# Patient Record
Sex: MALE | Race: WHITE | URBAN - METROPOLITAN AREA
[De-identification: names, ages, dates, MRNs, and addresses within clinical notes are randomized per-mention and may not be internally consistent; named-entity substitution may affect disease eponyms.]

---

## 2017-01-04 ENCOUNTER — HOSPITAL ENCOUNTER (OUTPATIENT)
Dept: PREADMISSION TESTING | Age: 47
Discharge: HOME | End: 2017-01-23
Admitting: ORTHOPAEDIC SURGERY

## 2017-01-04 VITALS
HEART RATE: 84 BPM | SYSTOLIC BLOOD PRESSURE: 137 MMHG | RESPIRATION RATE: 18 BRPM | DIASTOLIC BLOOD PRESSURE: 81 MMHG | TEMPERATURE: 98.9 F | HEIGHT: 72 IN | WEIGHT: 294 LBS | BODY MASS INDEX: 39.82 KG/M2

## 2017-01-04 LAB
ABO/RH: NORMAL
ANION GAP SERPL CALCULATED.3IONS-SCNC: 13 MMOL/L (ref 3–16)
ANTIBODY SCREEN: NORMAL
APTT: 36.1 SEC (ref 25.2–36.4)
BASOPHILS ABSOLUTE: 0.1 K/UL (ref 0–0.2)
BASOPHILS RELATIVE PERCENT: 0.7 %
BILIRUBIN URINE: NEGATIVE
BLOOD, URINE: NEGATIVE
BUN BLDV-MCNC: 13 MG/DL (ref 7–20)
CALCIUM SERPL-MCNC: 9.7 MG/DL (ref 8.3–10.6)
CHLORIDE BLD-SCNC: 98 MMOL/L (ref 99–110)
CLARITY: CLEAR
CO2: 29 MMOL/L (ref 21–32)
COLOR: YELLOW
CREAT SERPL-MCNC: 0.9 MG/DL (ref 0.9–1.3)
EOSINOPHILS ABSOLUTE: 0.2 K/UL (ref 0–0.6)
EOSINOPHILS RELATIVE PERCENT: 1.9 %
GFR AFRICAN AMERICAN: >60
GFR NON-AFRICAN AMERICAN: >60
GLUCOSE BLD-MCNC: 114 MG/DL (ref 70–99)
GLUCOSE URINE: NEGATIVE MG/DL
HCT VFR BLD CALC: 46 % (ref 40.5–52.5)
HEMOGLOBIN: 15.4 G/DL (ref 13.5–17.5)
INR BLD: 0.95 (ref 0.85–1.16)
KETONES, URINE: NEGATIVE MG/DL
LEUKOCYTE ESTERASE, URINE: NEGATIVE
LYMPHOCYTES ABSOLUTE: 2.6 K/UL (ref 1–5.1)
LYMPHOCYTES RELATIVE PERCENT: 23.1 %
MCH RBC QN AUTO: 28.7 PG (ref 26–34)
MCHC RBC AUTO-ENTMCNC: 33.6 G/DL (ref 31–36)
MCV RBC AUTO: 85.5 FL (ref 80–100)
MICROSCOPIC EXAMINATION: NORMAL
MONOCYTES ABSOLUTE: 0.8 K/UL (ref 0–1.3)
MONOCYTES RELATIVE PERCENT: 6.8 %
NEUTROPHILS ABSOLUTE: 7.5 K/UL (ref 1.7–7.7)
NEUTROPHILS RELATIVE PERCENT: 67.5 %
NITRITE, URINE: NEGATIVE
PDW BLD-RTO: 14 % (ref 12.4–15.4)
PH UA: 6.5
PLATELET # BLD: 313 K/UL (ref 135–450)
PMV BLD AUTO: 7.1 FL (ref 5–10.5)
POTASSIUM SERPL-SCNC: 4.2 MMOL/L (ref 3.5–5.1)
PROTEIN UA: NEGATIVE MG/DL
PROTHROMBIN TIME: 10.8 SEC (ref 9.8–13)
RBC # BLD: 5.37 M/UL (ref 4.2–5.9)
SODIUM BLD-SCNC: 140 MMOL/L (ref 136–145)
SPECIFIC GRAVITY UA: 1.01
URINE TYPE: NORMAL
UROBILINOGEN, URINE: 0.2 E.U./DL
WBC # BLD: 11.1 K/UL (ref 4–11)

## 2017-01-04 RX ORDER — FUROSEMIDE 20 MG/1
20 TABLET ORAL 2 TIMES DAILY
COMMUNITY

## 2017-01-04 RX ORDER — CHLORHEXIDINE GLUCONATE 0.12 MG/ML
15 RINSE ORAL 2 TIMES DAILY
Status: CANCELLED | OUTPATIENT
Start: 2017-01-04

## 2017-01-04 RX ORDER — POTASSIUM CHLORIDE 750 MG/1
10 CAPSULE, EXTENDED RELEASE ORAL 2 TIMES DAILY
COMMUNITY

## 2017-01-04 RX ORDER — VARENICLINE TARTRATE 1 MG/1
1 TABLET, FILM COATED ORAL 2 TIMES DAILY
COMMUNITY

## 2017-01-04 ASSESSMENT — PAIN DESCRIPTION - FREQUENCY: FREQUENCY: CONTINUOUS

## 2017-01-04 ASSESSMENT — PAIN DESCRIPTION - ORIENTATION: ORIENTATION: RIGHT

## 2017-01-04 ASSESSMENT — PAIN DESCRIPTION - DESCRIPTORS: DESCRIPTORS: NAGGING;CONSTANT

## 2017-01-04 ASSESSMENT — PAIN DESCRIPTION - LOCATION: LOCATION: KNEE;BACK

## 2017-01-05 LAB
MRSA SCREEN RT-PCR: NORMAL
URINE CULTURE, ROUTINE: NORMAL

## 2017-01-09 ENCOUNTER — TELEPHONE (OUTPATIENT)
Dept: ORTHOPEDIC SURGERY | Age: 47
End: 2017-01-09

## 2017-01-16 ENCOUNTER — TELEPHONE (OUTPATIENT)
Dept: ORTHOPEDIC SURGERY | Age: 47
End: 2017-01-16

## 2017-01-16 RX ORDER — CLINDAMYCIN HYDROCHLORIDE 300 MG/1
300 CAPSULE ORAL 3 TIMES DAILY
Qty: 30 CAPSULE | Refills: 0 | Status: SHIPPED | OUTPATIENT
Start: 2017-01-16 | End: 2017-01-26

## 2017-01-18 ENCOUNTER — OFFICE VISIT (OUTPATIENT)
Dept: ORTHOPEDIC SURGERY | Age: 47
End: 2017-01-18

## 2017-01-18 VITALS
DIASTOLIC BLOOD PRESSURE: 75 MMHG | BODY MASS INDEX: 39.9 KG/M2 | SYSTOLIC BLOOD PRESSURE: 124 MMHG | HEART RATE: 83 BPM | WEIGHT: 285 LBS | HEIGHT: 71 IN

## 2017-01-18 DIAGNOSIS — Z96.651 STATUS POST TOTAL RIGHT KNEE REPLACEMENT: Primary | ICD-10-CM

## 2017-01-18 PROCEDURE — 99024 POSTOP FOLLOW-UP VISIT: CPT | Performed by: ORTHOPAEDIC SURGERY

## 2017-01-19 ENCOUNTER — TELEPHONE (OUTPATIENT)
Dept: ORTHOPEDIC SURGERY | Age: 47
End: 2017-01-19

## 2017-01-25 ENCOUNTER — TELEPHONE (OUTPATIENT)
Dept: ORTHOPEDIC SURGERY | Age: 47
End: 2017-01-25

## 2017-01-26 DIAGNOSIS — M17.11 PRIMARY OSTEOARTHRITIS OF RIGHT KNEE: Primary | Chronic | ICD-10-CM

## 2017-01-30 ENCOUNTER — TELEPHONE (OUTPATIENT)
Dept: ORTHOPEDIC SURGERY | Age: 47
End: 2017-01-30

## 2017-02-15 ENCOUNTER — OFFICE VISIT (OUTPATIENT)
Dept: ORTHOPEDIC SURGERY | Age: 47
End: 2017-02-15

## 2017-02-15 VITALS
DIASTOLIC BLOOD PRESSURE: 81 MMHG | HEART RATE: 79 BPM | HEIGHT: 71 IN | WEIGHT: 285 LBS | BODY MASS INDEX: 39.9 KG/M2 | SYSTOLIC BLOOD PRESSURE: 138 MMHG

## 2017-02-15 DIAGNOSIS — M25.461 EFFUSION OF RIGHT KNEE: ICD-10-CM

## 2017-02-15 DIAGNOSIS — Z96.651 STATUS POST TOTAL RIGHT KNEE REPLACEMENT: Primary | ICD-10-CM

## 2017-02-15 PROCEDURE — 99024 POSTOP FOLLOW-UP VISIT: CPT | Performed by: ORTHOPAEDIC SURGERY

## 2017-02-15 RX ORDER — DICLOFENAC SODIUM 75 MG/1
75 TABLET, DELAYED RELEASE ORAL 2 TIMES DAILY
Qty: 60 TABLET | Refills: 3 | Status: SHIPPED | OUTPATIENT
Start: 2017-02-15

## 2021-07-16 RX ORDER — CLONIDINE HYDROCHLORIDE 0.1 MG/1
0.1 TABLET ORAL NIGHTLY
COMMUNITY

## 2021-07-16 RX ORDER — BUPRENORPHINE HYDROCHLORIDE AND NALOXONE HYDROCHLORIDE DIHYDRATE 8; 2 MG/1; MG/1
1 TABLET SUBLINGUAL DAILY
COMMUNITY

## 2021-07-16 RX ORDER — CARVEDILOL 6.25 MG/1
6.25 TABLET ORAL 2 TIMES DAILY WITH MEALS
COMMUNITY

## 2021-09-09 ENCOUNTER — OFFICE VISIT (OUTPATIENT)
Dept: BARIATRICS/WEIGHT MGMT | Facility: CLINIC | Age: 51
End: 2021-09-09

## 2021-09-09 ENCOUNTER — DOCUMENTATION (OUTPATIENT)
Dept: BARIATRICS/WEIGHT MGMT | Facility: CLINIC | Age: 51
End: 2021-09-09

## 2021-09-09 VITALS
HEIGHT: 70 IN | RESPIRATION RATE: 18 BRPM | TEMPERATURE: 98.4 F | BODY MASS INDEX: 45.1 KG/M2 | HEART RATE: 75 BPM | SYSTOLIC BLOOD PRESSURE: 130 MMHG | OXYGEN SATURATION: 98 % | WEIGHT: 315 LBS | DIASTOLIC BLOOD PRESSURE: 74 MMHG

## 2021-09-09 DIAGNOSIS — K21.9 GASTROESOPHAGEAL REFLUX DISEASE, UNSPECIFIED WHETHER ESOPHAGITIS PRESENT: ICD-10-CM

## 2021-09-09 DIAGNOSIS — R53.83 FATIGUE, UNSPECIFIED TYPE: ICD-10-CM

## 2021-09-09 DIAGNOSIS — M54.9 BACK PAIN, UNSPECIFIED BACK LOCATION, UNSPECIFIED BACK PAIN LATERALITY, UNSPECIFIED CHRONICITY: ICD-10-CM

## 2021-09-09 DIAGNOSIS — Z87.01 HISTORY OF PNEUMONIA: ICD-10-CM

## 2021-09-09 DIAGNOSIS — I10 ESSENTIAL HYPERTENSION: ICD-10-CM

## 2021-09-09 DIAGNOSIS — E66.01 OBESITY, CLASS III, BMI 40-49.9 (MORBID OBESITY) (HCC): Primary | ICD-10-CM

## 2021-09-09 DIAGNOSIS — G47.33 OBSTRUCTIVE SLEEP APNEA SYNDROME: ICD-10-CM

## 2021-09-09 DIAGNOSIS — M19.90 OSTEOARTHRITIS, UNSPECIFIED OSTEOARTHRITIS TYPE, UNSPECIFIED SITE: ICD-10-CM

## 2021-09-09 DIAGNOSIS — I87.2 VENOUS (PERIPHERAL) INSUFFICIENCY: ICD-10-CM

## 2021-09-09 DIAGNOSIS — R10.13 DYSPEPSIA: ICD-10-CM

## 2021-09-09 PROCEDURE — 99204 OFFICE O/P NEW MOD 45 MIN: CPT | Performed by: SURGERY

## 2021-09-09 RX ORDER — GABAPENTIN 600 MG/1
600 TABLET ORAL 2 TIMES DAILY
COMMUNITY
Start: 2021-06-07

## 2021-09-09 RX ORDER — CLONIDINE HYDROCHLORIDE 0.2 MG/1
0.2 TABLET ORAL NIGHTLY
COMMUNITY
Start: 2021-06-29 | End: 2021-11-03 | Stop reason: SDUPTHER

## 2021-09-09 RX ORDER — TOPIRAMATE 50 MG/1
50 TABLET, FILM COATED ORAL 2 TIMES DAILY
Status: ON HOLD | COMMUNITY
Start: 2021-06-29 | End: 2021-11-01

## 2021-09-09 RX ORDER — SODIUM CHLORIDE 9 MG/ML
150 INJECTION, SOLUTION INTRAVENOUS CONTINUOUS
Status: CANCELLED | OUTPATIENT
Start: 2021-09-09

## 2021-09-09 NOTE — H&P
[facility] GROUP BARIATRIC SURGERY  [address]  [address]  [facility] [address]  [phone]      Patient  Name:  Gomez Leach  :  1970      Date of Visit: 2021      Chief Complaint:  weight gain; unable to maintain weight loss    History of Present Illness:  Gomez Leach is a 51 y.o. male who presents today for evaluation, education and consultation regarding weight loss surgery. The patient is interested in sleeve gastrectomy.     Gomez has been overweight for at least 35 years, has been 35 pounds or more overweight for at least 35 years, has been 100 pounds or more overweight for 30 or more years and started dieting at age 24.  The patient describes their eating habits as volume eater and snacker.      Previous diet attempts include: atkins, generally eating less, exercise Topamax, metformin among others.  The most weight Gomez lost was 80 pounds on not eating due to depression but was only able to maintain that weight loss for a short time.  His maximum lifetime weight is 354 pounds.    As above, patient has been overweight for many years, with numerous failed dietary/weight loss attempts.  She now has obesity related comorbidities and as such has decided to pursue weight loss surgery.    All past medical, surgical, social and family history have been obtained and discussed as pertinent to bariatric surgery as below.     Denies postprandial nausea or RUQ pain.    Past Medical History:   Diagnosis Date   • Anxiety    • Back pain    • Depression    • Dyspepsia    • Fatigue    • GERD (gastroesophageal reflux disease)     in the past, no longer has.  Does not take medicine anymore.  Previously severe.   • History of narcotic addiction (CMS/[facility]) 2017    on suboxone.     • HTN (hypertension)    • Migraine     but is on topamax for weight loss   • BERKLEY (obstructive sleep apnea)     bipap compliant   • Osteoarthritis    • Peripheral neuropathy     attributed to bulging discs,  DDD   • Pneumonia     in hospital 39 days.  Had seizures. on ventilator.  Had trach/PEG   • Prediabetes    • Seizures (CMS/HCC)     2 years ago during ICU admission for pneumonia requiring prolonged ventilation   • Venous insufficiency     d/t venous reflux.  Chronic skin changes and occasional nonhealing ulcer.  Typically wears compression hose.     Past Surgical History:   Procedure Laterality Date   • CARPAL TUNNEL RELEASE Bilateral    • COLONOSCOPY     • EYE ENUCLEATION      right eye, traumatic injury as a child, has prosthesis   • HERNIA REPAIR      recurrent, pt reports 4 repair, now has a mesh.  [place]   • KNEE SURGERY Right     TKR and 2 meniscus repair before   • ORIF TIBIA/FIBULA FRACTURES Bilateral    • PEG TUBE INSERTION      2 years ago after aspiration pneumonia   • TRACHEOSTOMY     • VEIN SURGERY         No Known Allergies    Current Outpatient Medications:   •  buprenorphine-naloxone (SUBOXONE) 8-2 MG per SL tablet, Place 1 tablet under the tongue Daily., Disp: , Rfl:   •  Cariprazine HCl (Vraylar) 3 MG capsule capsule, Take 3 mg by mouth Daily., Disp: , Rfl:   •  carvedilol (COREG) 6.25 MG tablet, Take 6.25 mg by mouth 2 (Two) Times a Day With Meals., Disp: , Rfl:   •  cloNIDine (CATAPRES) 0.2 MG tablet, Take 0.2 mg by mouth Every Night., Disp: , Rfl:   •  gabapentin (NEURONTIN) 600 MG tablet, Take 600 mg by mouth 2 (two) times a day., Disp: , Rfl:   •  metFORMIN (GLUCOPHAGE) 850 MG tablet, Take 850 mg by mouth 2 (Two) Times a Day With Meals., Disp: , Rfl:   •  topiramate (TOPAMAX) 50 MG tablet, Take 50 mg by mouth 2 (two) times a day., Disp: , Rfl:   •  cloNIDine (CATAPRES) 0.1 MG tablet, Take 0.1 mg by mouth Every Night., Disp: , Rfl:     Social History     Socioeconomic History   • Marital status:      Spouse name: Not on file   • Number of children: Not on file   • Years of education: Not on file   • Highest education level: Not on file   Tobacco Use   • Smoking status: Former  Smoker     Years: 35.00     Types: Cigarettes     Quit date:      Years since quittin.6   • Smokeless tobacco: Never Used   • Tobacco comment: uses tobacco free/nicotine free chew.  Sometimes chews nicotine gum   Substance and Sexual Activity   • Alcohol use: Yes     Comment: infrequent   • Drug use: Not Currently     Family History   Problem Relation Age of Onset   • Hypertension Mother    • Stroke Mother    • Stroke Father        Review of Systems   Constitutional: Positive for fatigue and unexpected weight gain. Negative for chills, diaphoresis, fever and unexpected weight loss.   HENT: Negative for congestion and facial swelling.    Eyes: Negative for blurred vision, double vision and discharge.   Respiratory: Negative for chest tightness, shortness of breath and stridor.    Cardiovascular: Negative for chest pain, palpitations and leg swelling.   Gastrointestinal: Negative for blood in stool.   Endocrine: Negative for polydipsia.   Genitourinary: Negative for hematuria.   Musculoskeletal: Positive for arthralgias.   Skin: Negative for color change.   Allergic/Immunologic: Negative for immunocompromised state.   Neurological: Negative for confusion.   Psychiatric/Behavioral: Negative for self-injury.        Physical Exam:  Vital Signs:  Weight: (!) 146 kg (322 lb)   Body mass index is 46.2 kg/m².  Temp: 98.4 °F (36.9 °C)   Heart Rate: 75   BP: 130/74     Physical Exam  Vitals reviewed.   Constitutional:       Appearance: He is well-developed.   HENT:      Head: Normocephalic and atraumatic.      Comments: Trach scar     Nose: Nose normal.   Eyes:      Conjunctiva/sclera: Conjunctivae normal.      Pupils: Pupils are equal, round, and reactive to light.   Neck:      Thyroid: No thyromegaly.      Vascular: No carotid bruit.      Trachea: No tracheal deviation.   Cardiovascular:      Rate and Rhythm: Normal rate and regular rhythm.      Heart sounds: Normal heart sounds.   Pulmonary:      Effort: Pulmonary  effort is normal. No respiratory distress.      Breath sounds: Normal breath sounds.   Abdominal:      General: There is no distension.      Palpations: Abdomen is soft.      Tenderness: There is no abdominal tenderness.      Comments: Infraumbilical and supra umbilical scars from hernia repair.  Upper midline small incision from previous PEG tube   Musculoskeletal:         General: No deformity. Normal range of motion.      Cervical back: Normal range of motion and neck supple.      Comments: Chronic widespread venous stasis skin changes with b/l legs brown/purple from mid calf down.  Small scabbed ulcer on left lateral calf   Skin:     General: Skin is warm and dry.      Findings: No rash.   Neurological:      Mental Status: He is alert and oriented to person, place, and time.      Cranial Nerves: No cranial nerve deficit.      Coordination: Coordination normal.   Psychiatric:         Behavior: Behavior normal.         Thought Content: Thought content normal.         Judgment: Judgment normal.         There is no problem list on file for this patient.      Assessment:    Gomez Leach is a 51 y.o. year old male with medically complicated obesity pursuing sleeve gastrectomy.    Weight loss surgery is deemed medically necessary given the following obesity related comorbidities including sleep apnea, hypertension, osteoarthritis, back pain, GERD, edema, depression, substance abuse and tobacco use with current Weight: (!) 146 kg (322 lb) and Body mass index is 46.2 kg/m²..    He is a good candidate for weight loss surgery pending further evaluation.    Plan:  The consultation plan and program requirements were reviewed with the patient.  A psychological evaluation will be arranged.  A nutritional evaluation will be performed.  The patient was advised to start a high protein and low carbohydrate diet.  Necessary lifestyle modifications were discussed.  Instructions on how to access DONTAE was given to the patient.  DONTAE  is an internet based educational video that explains the surgical procedure chosen and answers basic questions regarding that procedure.     Preoperative testing will include: CBC, CMP, Lipids, TSH, HgA1C, H.Pylori UBT, EKG, CXR, Pulmonary Function Testing, Nicotine Testing and EGD (at [facility], not [facility], with respect to previous trach and possible airway issues).    Preop clearances required prior to surgery will include Cardiology.  Pulmonology. Instructions from suboxone clinic for perioperative use of suboxone    Patient understands that bariatric surgery is not cosmetic surgery but rather a tool to help make a lifelong commitment to lifestyle changes including diet, exercise, behavior modifications, and healthy habits.  The patient has been educated on expected postoperative lifestyle changes, including commitment to high protein diet, vitamin regimen, and exercise program.  They are aware that support groups are encouraged for optimal weight loss results.        I spent 9 minutes discussion smoking cessation and/or avoidance of second-hand smoke.        Candice Hayes MD

## 2021-09-09 NOTE — PROGRESS NOTES
"Weight Loss Surgery  Presurgical Nutrition Assessment     Gomez Leach  09/09/2021  [id number]  [id number]  1970  male    Surgery desired: Sleeve Gastrectomy  Ht 177.8 cm (70\"); Wt 146 kg (322 #); BMI 46.2  No past medical history on file.  Past Surgical History:   Procedure Laterality Date   • COLONOSCOPY     • HERNIA REPAIR     • KNEE SURGERY Right      No Known Allergies    Current Outpatient Medications:   •  buprenorphine-naloxone (SUBOXONE) 8-2 MG per SL tablet, Place 1 tablet under the tongue Daily., Disp: , Rfl:   •  Cariprazine HCl (Vraylar) 3 MG capsule capsule, Take 3 mg by mouth Daily., Disp: , Rfl:   •  carvedilol (COREG) 6.25 MG tablet, Take 6.25 mg by mouth 2 (Two) Times a Day With Meals., Disp: , Rfl:   •  cloNIDine (CATAPRES) 0.1 MG tablet, Take 0.1 mg by mouth Every Night., Disp: , Rfl:   •  cloNIDine (CATAPRES) 0.2 MG tablet, Take 0.2 mg by mouth Every Night., Disp: , Rfl:   •  gabapentin (NEURONTIN) 600 MG tablet, Take 600 mg by mouth 2 (two) times a day., Disp: , Rfl:   •  metFORMIN (GLUCOPHAGE) 850 MG tablet, Take 850 mg by mouth 2 (Two) Times a Day With Meals., Disp: , Rfl:   •  topiramate (TOPAMAX) 50 MG tablet, Take 50 mg by mouth 2 (two) times a day., Disp: , Rfl:       Nutrition Assessment    Estimated energy needs:  2315 kcal    Estimated calories for weight loss:  1800 kcal    IBW (Pounds):  175 #        Excess body weight (Pounds):  145 #       Nutrition Recall  24 Hour recall: (B) (L) (D) -  Reviewed and discussed with patient.  Pt has been eating healthier than usual during the past four months, and since May, 2021, he has lost 18 pounds  He is not eating out and does most of the cooking himself.  Drinking only an estimated 3 beers in the past month.  Drinks no coffee, but beverages of choice are diet Mt Dew & sweet tea.  Pt will work to wean self off of all except unsweetened tea.  No coffee.  Breakfast @ 8:30 am = 1 each egg, cheese slice & sausage on an English muffin /c 32 " oz of Mt Dew.  Lunch @ 11:30 am = 3 [employer] CBs /c 30 oz fountain sweet tea.  Snack @ 6 pm = 3 oz pork skins & 1 glass sweet tea. Dinner @ 9:30 pm = 2 slices pizza & 30 oz glass of sweet tea. Diet marginal but still low in protein and devoid of fruits & veggies. Pt states he loves salad /c egg & cheese. Pt to focus on eating adequate protein for weight management in 3 regular balanced meals & 2 to 3 high protein snacks per day.         Exercise  Strength & conditioning exercises daily at home now. Has rejoined gym and will start workouts there.      Education    Provided information packet re:  Sleeve Gastrectomy  1. Reviewed guidelines for higher protein, limited carbohydrate diet to promote weight loss.  Encouraged patient to incorporate these principles of healthy eating from now until approximately 2 weeks prior to bariatric surgery date, when an even lower carbohydrate “liver-shrinking” regimen will be followed. (Information sheet re pre-op diet given).  Explained that after recovery from surgery this diet will again be followed to ensure further loss and for weight maintenance.    2. Encouraged patient to choose an acceptable protein supplement powder or shake for post-surgery liquid diet.  Provided product guidelines and examples.    3. Explained importance of goal setting to help in changing eating behaviors that are not conducive to weight loss.  Targeted several on a worksheet which also included spaces for patient to work on issues specific to them.  4. Provided follow-up options for support, including contact information for dietitians here, if desired.  Web-based support information and apps for smart phones and computers given.  Noted that monthly support group is offered at this clinic, and that support is associated with successful weight loss.    Recommend that team proceed with surgery and follow per protocol.      Nutrition Goals   Dietary Guidelines per information packet as described  above  Protein goal:  grams per day   Carbohydrate goal:  100-140 grams per day  Eliminate soda, sweet tea, etc.     Exercise Goals  Continue current exercise routine   Add 15-30 minutes of activity per day as tolerated      Ale Amaro RD  09/09/2021  11:24 EDT

## 2021-09-09 NOTE — PROGRESS NOTES
[facility] GROUP BARIATRIC SURGERY  [address]  [address]  [facility] [address]  [phone]      Patient  Name:  Gomez Leach  :  1970      Date of Visit: 2021      Chief Complaint:  weight gain; unable to maintain weight loss    History of Present Illness:  Gomez Leach is a 51 y.o. male who presents today for evaluation, education and consultation regarding weight loss surgery. The patient is interested in sleeve gastrectomy.     Gomez has been overweight for at least 35 years, has been 35 pounds or more overweight for at least 35 years, has been 100 pounds or more overweight for 30 or more years and started dieting at age 24.  The patient describes their eating habits as volume eater and snacker.      Previous diet attempts include: atkins, generally eating less, exercise Topamax, metformin among others.  The most weight Gomez lost was 80 pounds on not eating due to depression but was only able to maintain that weight loss for a short time.  His maximum lifetime weight is 354 pounds.    As above, patient has been overweight for many years, with numerous failed dietary/weight loss attempts.  She now has obesity related comorbidities and as such has decided to pursue weight loss surgery.    All past medical, surgical, social and family history have been obtained and discussed as pertinent to bariatric surgery as below.     Denies postprandial nausea or RUQ pain.    Past Medical History:   Diagnosis Date   • Anxiety    • Back pain    • Depression    • Dyspepsia    • Fatigue    • GERD (gastroesophageal reflux disease)     in the past, no longer has.  Does not take medicine anymore.  Previously severe.   • History of narcotic addiction (CMS/[facility]) 2017    on suboxone.     • HTN (hypertension)    • Migraine     but is on topamax for weight loss   • BERKLEY (obstructive sleep apnea)     bipap compliant   • Osteoarthritis    • Peripheral neuropathy     attributed to bulging discs,  DDD   • Pneumonia     in hospital 39 days.  Had seizures. on ventilator.  Had trach/PEG   • Prediabetes    • Seizures (CMS/HCC)     2 years ago during ICU admission for pneumonia requiring prolonged ventilation   • Venous insufficiency     d/t venous reflux.  Chronic skin changes and occasional nonhealing ulcer.  Typically wears compression hose.     Past Surgical History:   Procedure Laterality Date   • CARPAL TUNNEL RELEASE Bilateral    • COLONOSCOPY     • EYE ENUCLEATION      right eye, traumatic injury as a child, has prosthesis   • HERNIA REPAIR      recurrent, pt reports 4 repair, now has a mesh.  [place]   • KNEE SURGERY Right     TKR and 2 meniscus repair before   • ORIF TIBIA/FIBULA FRACTURES Bilateral    • PEG TUBE INSERTION      2 years ago after aspiration pneumonia   • TRACHEOSTOMY     • VEIN SURGERY         No Known Allergies    Current Outpatient Medications:   •  buprenorphine-naloxone (SUBOXONE) 8-2 MG per SL tablet, Place 1 tablet under the tongue Daily., Disp: , Rfl:   •  Cariprazine HCl (Vraylar) 3 MG capsule capsule, Take 3 mg by mouth Daily., Disp: , Rfl:   •  carvedilol (COREG) 6.25 MG tablet, Take 6.25 mg by mouth 2 (Two) Times a Day With Meals., Disp: , Rfl:   •  cloNIDine (CATAPRES) 0.2 MG tablet, Take 0.2 mg by mouth Every Night., Disp: , Rfl:   •  gabapentin (NEURONTIN) 600 MG tablet, Take 600 mg by mouth 2 (two) times a day., Disp: , Rfl:   •  metFORMIN (GLUCOPHAGE) 850 MG tablet, Take 850 mg by mouth 2 (Two) Times a Day With Meals., Disp: , Rfl:   •  topiramate (TOPAMAX) 50 MG tablet, Take 50 mg by mouth 2 (two) times a day., Disp: , Rfl:   •  cloNIDine (CATAPRES) 0.1 MG tablet, Take 0.1 mg by mouth Every Night., Disp: , Rfl:     Social History     Socioeconomic History   • Marital status:      Spouse name: Not on file   • Number of children: Not on file   • Years of education: Not on file   • Highest education level: Not on file   Tobacco Use   • Smoking status: Former  Smoker     Years: 35.00     Types: Cigarettes     Quit date:      Years since quittin.6   • Smokeless tobacco: Never Used   • Tobacco comment: uses tobacco free/nicotine free chew.  Sometimes chews nicotine gum   Substance and Sexual Activity   • Alcohol use: Yes     Comment: infrequent   • Drug use: Not Currently     Family History   Problem Relation Age of Onset   • Hypertension Mother    • Stroke Mother    • Stroke Father        Review of Systems   Constitutional: Positive for fatigue and unexpected weight gain. Negative for chills, diaphoresis, fever and unexpected weight loss.   HENT: Negative for congestion and facial swelling.    Eyes: Negative for blurred vision, double vision and discharge.   Respiratory: Negative for chest tightness, shortness of breath and stridor.    Cardiovascular: Negative for chest pain, palpitations and leg swelling.   Gastrointestinal: Negative for blood in stool.   Endocrine: Negative for polydipsia.   Genitourinary: Negative for hematuria.   Musculoskeletal: Positive for arthralgias.   Skin: Negative for color change.   Allergic/Immunologic: Negative for immunocompromised state.   Neurological: Negative for confusion.   Psychiatric/Behavioral: Negative for self-injury.        Physical Exam:  Vital Signs:  Weight: (!) 146 kg (322 lb)   Body mass index is 46.2 kg/m².  Temp: 98.4 °F (36.9 °C)   Heart Rate: 75   BP: 130/74     Physical Exam  Vitals reviewed.   Constitutional:       Appearance: He is well-developed.   HENT:      Head: Normocephalic and atraumatic.      Comments: Trach scar     Nose: Nose normal.   Eyes:      Conjunctiva/sclera: Conjunctivae normal.      Pupils: Pupils are equal, round, and reactive to light.   Neck:      Thyroid: No thyromegaly.      Vascular: No carotid bruit.      Trachea: No tracheal deviation.   Cardiovascular:      Rate and Rhythm: Normal rate and regular rhythm.      Heart sounds: Normal heart sounds.   Pulmonary:      Effort: Pulmonary  effort is normal. No respiratory distress.      Breath sounds: Normal breath sounds.   Abdominal:      General: There is no distension.      Palpations: Abdomen is soft.      Tenderness: There is no abdominal tenderness.      Comments: Infraumbilical and supra umbilical scars from hernia repair.  Upper midline small incision from previous PEG tube   Musculoskeletal:         General: No deformity. Normal range of motion.      Cervical back: Normal range of motion and neck supple.      Comments: Chronic widespread venous stasis skin changes with b/l legs brown/purple from mid calf down.  Small scabbed ulcer on left lateral calf   Skin:     General: Skin is warm and dry.      Findings: No rash.   Neurological:      Mental Status: He is alert and oriented to person, place, and time.      Cranial Nerves: No cranial nerve deficit.      Coordination: Coordination normal.   Psychiatric:         Behavior: Behavior normal.         Thought Content: Thought content normal.         Judgment: Judgment normal.         There is no problem list on file for this patient.      Assessment:    Gomez Leach is a 51 y.o. year old male with medically complicated obesity pursuing sleeve gastrectomy.    Weight loss surgery is deemed medically necessary given the following obesity related comorbidities including sleep apnea, hypertension, osteoarthritis, back pain, GERD, edema, depression, substance abuse and tobacco use with current Weight: (!) 146 kg (322 lb) and Body mass index is 46.2 kg/m²..    He is a good candidate for weight loss surgery pending further evaluation.    Plan:  The consultation plan and program requirements were reviewed with the patient.  A psychological evaluation will be arranged.  A nutritional evaluation will be performed.  The patient was advised to start a high protein and low carbohydrate diet.  Necessary lifestyle modifications were discussed.  Instructions on how to access DONTAE was given to the patient.  DONTAE  is an internet based educational video that explains the surgical procedure chosen and answers basic questions regarding that procedure.     Preoperative testing will include: CBC, CMP, Lipids, TSH, HgA1C, H.Pylori UBT, EKG, CXR, Pulmonary Function Testing, Nicotine Testing and EGD (at [facility], not [facility], with respect to previous trach and possible airway issues).    Preop clearances required prior to surgery will include Cardiology.  Pulmonology. Instructions from suboxone clinic for perioperative use of suboxone    Patient understands that bariatric surgery is not cosmetic surgery but rather a tool to help make a lifelong commitment to lifestyle changes including diet, exercise, behavior modifications, and healthy habits.  The patient has been educated on expected postoperative lifestyle changes, including commitment to high protein diet, vitamin regimen, and exercise program.  They are aware that support groups are encouraged for optimal weight loss results.        I spent 9 minutes discussion smoking cessation and/or avoidance of second-hand smoke.        Candice Hayes MD

## 2021-09-10 LAB
ALBUMIN SERPL-MCNC: 4.3 G/DL (ref 3.8–4.9)
ALBUMIN/GLOB SERPL: 1.9 {RATIO} (ref 1.2–2.2)
ALP SERPL-CCNC: 79 IU/L (ref 48–121)
ALT SERPL-CCNC: 12 IU/L (ref 0–44)
AST SERPL-CCNC: 12 IU/L (ref 0–40)
BASOPHILS # BLD AUTO: 0.1 X10E3/UL (ref 0–0.2)
BASOPHILS NFR BLD AUTO: 1 %
BILIRUB SERPL-MCNC: 0.4 MG/DL (ref 0–1.2)
BUN SERPL-MCNC: 17 MG/DL (ref 6–24)
BUN/CREAT SERPL: 13 (ref 9–20)
CALCIUM SERPL-MCNC: 9.4 MG/DL (ref 8.7–10.2)
CHLORIDE SERPL-SCNC: 102 MMOL/L (ref 96–106)
CHOLEST SERPL-MCNC: 148 MG/DL (ref 100–199)
CO2 SERPL-SCNC: 24 MMOL/L (ref 20–29)
CREAT SERPL-MCNC: 1.29 MG/DL (ref 0.76–1.27)
EOSINOPHIL # BLD AUTO: 0.1 X10E3/UL (ref 0–0.4)
EOSINOPHIL NFR BLD AUTO: 2 %
ERYTHROCYTE [DISTWIDTH] IN BLOOD BY AUTOMATED COUNT: 14.6 % (ref 11.6–15.4)
GLOBULIN SER CALC-MCNC: 2.3 G/DL (ref 1.5–4.5)
GLUCOSE SERPL-MCNC: 96 MG/DL (ref 65–99)
HBA1C MFR BLD: 5.8 % (ref 4.8–5.6)
HCT VFR BLD AUTO: 39.2 % (ref 37.5–51)
HDLC SERPL-MCNC: 45 MG/DL
HGB BLD-MCNC: 12.9 G/DL (ref 13–17.7)
IMM GRANULOCYTES # BLD AUTO: 0 X10E3/UL (ref 0–0.1)
IMM GRANULOCYTES NFR BLD AUTO: 1 %
LDLC SERPL CALC-MCNC: 79 MG/DL (ref 0–99)
LYMPHOCYTES # BLD AUTO: 2.1 X10E3/UL (ref 0.7–3.1)
LYMPHOCYTES NFR BLD AUTO: 23 %
MCH RBC QN AUTO: 27.6 PG (ref 26.6–33)
MCHC RBC AUTO-ENTMCNC: 32.9 G/DL (ref 31.5–35.7)
MCV RBC AUTO: 84 FL (ref 79–97)
MONOCYTES # BLD AUTO: 0.5 X10E3/UL (ref 0.1–0.9)
MONOCYTES NFR BLD AUTO: 6 %
NEUTROPHILS # BLD AUTO: 6 X10E3/UL (ref 1.4–7)
NEUTROPHILS NFR BLD AUTO: 67 %
PLATELET # BLD AUTO: 253 X10E3/UL (ref 150–450)
POTASSIUM SERPL-SCNC: 4.8 MMOL/L (ref 3.5–5.2)
PROT SERPL-MCNC: 6.6 G/DL (ref 6–8.5)
RBC # BLD AUTO: 4.67 X10E6/UL (ref 4.14–5.8)
SODIUM SERPL-SCNC: 138 MMOL/L (ref 134–144)
TRIGL SERPL-MCNC: 134 MG/DL (ref 0–149)
TSH SERPL DL<=0.005 MIU/L-ACNC: 1.99 UIU/ML (ref 0.45–4.5)
UREA BREATH TEST QL: NEGATIVE
VLDLC SERPL CALC-MCNC: 24 MG/DL (ref 5–40)
WBC # BLD AUTO: 8.8 X10E3/UL (ref 3.4–10.8)

## 2021-09-13 DIAGNOSIS — N18.9 CHRONIC KIDNEY DISEASE, UNSPECIFIED CKD STAGE: Primary | ICD-10-CM

## 2021-09-15 PROBLEM — K21.9 GASTROESOPHAGEAL REFLUX DISEASE: Status: ACTIVE | Noted: 2021-09-15

## 2021-09-17 ENCOUNTER — APPOINTMENT (OUTPATIENT)
Dept: PREADMISSION TESTING | Facility: HOSPITAL | Age: 51
End: 2021-09-17

## 2021-10-21 ENCOUNTER — TELEMEDICINE (OUTPATIENT)
Dept: BARIATRICS/WEIGHT MGMT | Facility: CLINIC | Age: 51
End: 2021-10-21

## 2021-10-21 DIAGNOSIS — K21.9 GASTROESOPHAGEAL REFLUX DISEASE, UNSPECIFIED WHETHER ESOPHAGITIS PRESENT: Primary | ICD-10-CM

## 2021-10-21 PROCEDURE — 99423 OL DIG E/M SVC 21+ MIN: CPT | Performed by: SURGERY

## 2021-10-21 NOTE — PROGRESS NOTES
[facility] Bariatric Surgery  [address]  [address]  [facility] [id number] [phone]        Patient Name: Gomez Leach.  YOB: 1970      Date of Visit: 10/21/2021      Reason for Visit:  GERD,     HPI:  Gomez Leach is a 51 y.o. male pursuing MBS. Has hx of GERD for which he no longer takes medicines, previously severe. At last office discussed hx of narcotics abuse, currently on Suboxone. Since last visit, denies changes to medical hx.      Past Medical History:   Diagnosis Date   • Anxiety    • Back pain    • Depression    • Dyspepsia    • Fatigue    • GERD (gastroesophageal reflux disease)     in the past, no longer has.  Does not take medicine anymore.  Previously severe.   • History of narcotic addiction (CMS/HCC) 11/09/2017    on suboxone.     • HTN (hypertension)    • Migraine     but is on topamax for weight loss   • BERKLEY (obstructive sleep apnea)     bipap compliant   • Osteoarthritis    • Peripheral neuropathy     attributed to bulging discs, DDD   • Pneumonia     in hospital 39 days.  Had seizures. on ventilator.  Had trach/PEG   • Prediabetes    • Seizures (CMS/HCC)     2 years ago during ICU admission for pneumonia requiring prolonged ventilation   • Venous insufficiency     d/t venous reflux.  Chronic skin changes and occasional nonhealing ulcer.  Typically wears compression hose.     Past Surgical History:   Procedure Laterality Date   • CARPAL TUNNEL RELEASE Bilateral    • COLONOSCOPY     • EYE ENUCLEATION      right eye, traumatic injury as a child, has prosthesis   • HERNIA REPAIR      recurrent, pt reports 4 repair, now has a mesh.  [place], [place]   • KNEE SURGERY Right     TKR and 2 meniscus repair before   • ORIF TIBIA/FIBULA FRACTURES Bilateral    • PEG TUBE INSERTION      2 years ago after aspiration pneumonia   • TRACHEOSTOMY     • VEIN SURGERY       No outpatient medications have been marked as taking for the 10/21/21 encounter (Telemedicine) with  Candice Hayes MD.     No Known Allergies    Social History     Socioeconomic History   • Marital status:    Tobacco Use   • Smoking status: Former Smoker     Years: 35.00     Types: Cigarettes     Quit date: 2019     Years since quittin.8   • Smokeless tobacco: Never Used   • Tobacco comment: uses tobacco free/nicotine free chew.  Sometimes chews nicotine gum   Substance and Sexual Activity   • Alcohol use: Yes     Comment: infrequent   • Drug use: Not Currently       There were no vitals filed for this visit.  Weight    There is no height or weight on file to calculate BMI.    Physical Exam  Constitutional:       General: He is not in acute distress.     Appearance: Normal appearance. He is not ill-appearing.   HENT:      Head: Normocephalic and atraumatic.      Nose: Nose normal.   Eyes:      General: No scleral icterus.     Extraocular Movements: Extraocular movements intact.      Conjunctiva/sclera: Conjunctivae normal.      Pupils: Pupils are equal, round, and reactive to light.   Pulmonary:      Effort: Pulmonary effort is normal. No respiratory distress.   Skin:     Coloration: Skin is not pale.   Neurological:      Mental Status: He is alert and oriented to person, place, and time.   Psychiatric:         Mood and Affect: Mood normal.         Behavior: Behavior normal.           Assessment:      ICD-10-CM ICD-9-CM   1. Gastroesophageal reflux disease, unspecified whether esophagitis present  K21.9 530.81       Plan:     EGD with biopsy.  Pt instructed to adhere to NPO after midnight, full liquids for 24 hours before procedure.      The risks and benefits of the upper endoscopy were discussed with the patient in detail and all questions were answered.  Possibility of perforation, bleeding, aspiration, and anesthesia reaction were reviewed.  Patient agrees to proceed.      This visit was conducted as a video visit, in an effort to limit spread of the novel coronavirus during the [id number]  pandemic.  The patient gave consent.      COVID-19 Questionnaire:    1.  Have you previously been tested for COVID-19?      []  No  [x]  Yes    2.  Were you ever positive for COVID-19?        [x]  No  []  Yes    3.  Are you employed in a healthcare setting?      [x]  No  []  Yes    4.  Are you symptomatic for COVID-19 as defined by the CDC (fever, cough)?  If so, when did symptoms begin?      [x]  No    []  Yes, symptoms began     5.  Have you been hospitalized for COVID-19?  If so, were you in the ICU?    [x]  No    []  Yes, but not in the ICU    []  Yes, and I was in the ICU    6.  Are you a resident in a congregate (group care setting?)      [x]  No  []  Yes    7.  Are you pregnant?    [x]  No  []  Yes    8.  Are you vaccinated?      []  No  []  Yes, but only partially   [x]  Yes, fully

## 2021-10-29 ENCOUNTER — PRE-ADMISSION TESTING (OUTPATIENT)
Dept: PREADMISSION TESTING | Facility: HOSPITAL | Age: 51
End: 2021-10-29

## 2021-10-29 VITALS — WEIGHT: 315 LBS | BODY MASS INDEX: 46.15 KG/M2

## 2021-10-29 LAB
DEPRECATED RDW RBC AUTO: 40.1 FL (ref 37–54)
ERYTHROCYTE [DISTWIDTH] IN BLOOD BY AUTOMATED COUNT: 12.6 % (ref 12.3–15.4)
HCT VFR BLD AUTO: 40.4 % (ref 37.5–51)
HGB BLD-MCNC: 13.3 G/DL (ref 13–17.7)
MCH RBC QN AUTO: 29 PG (ref 26.6–33)
MCHC RBC AUTO-ENTMCNC: 32.9 G/DL (ref 31.5–35.7)
MCV RBC AUTO: 88.2 FL (ref 79–97)
PLATELET # BLD AUTO: 212 10*3/MM3 (ref 140–450)
PMV BLD AUTO: 9.5 FL (ref 6–12)
POTASSIUM SERPL-SCNC: 5.2 MMOL/L (ref 3.5–5.2)
QT INTERVAL: 424 MS
QTC INTERVAL: 424 MS
RBC # BLD AUTO: 4.58 10*6/MM3 (ref 4.14–5.8)
SARS-COV-2 RNA PNL SPEC NAA+PROBE: NOT DETECTED
WBC # BLD AUTO: 7.51 10*3/MM3 (ref 3.4–10.8)

## 2021-10-29 PROCEDURE — U0004 COV-19 TEST NON-CDC HGH THRU: HCPCS

## 2021-10-29 PROCEDURE — C9803 HOPD COVID-19 SPEC COLLECT: HCPCS

## 2021-10-29 PROCEDURE — 85027 COMPLETE CBC AUTOMATED: CPT

## 2021-10-29 PROCEDURE — 93010 ELECTROCARDIOGRAM REPORT: CPT | Performed by: INTERNAL MEDICINE

## 2021-10-29 PROCEDURE — 84132 ASSAY OF SERUM POTASSIUM: CPT

## 2021-10-29 PROCEDURE — 93005 ELECTROCARDIOGRAM TRACING: CPT

## 2021-10-29 PROCEDURE — 36415 COLL VENOUS BLD VENIPUNCTURE: CPT

## 2021-10-31 ENCOUNTER — ANESTHESIA EVENT (OUTPATIENT)
Dept: GASTROENTEROLOGY | Facility: HOSPITAL | Age: 51
End: 2021-10-31

## 2021-10-31 RX ORDER — FAMOTIDINE 20 MG/1
20 TABLET, FILM COATED ORAL ONCE
Status: CANCELLED | OUTPATIENT
Start: 2021-10-31 | End: 2021-10-31

## 2021-10-31 RX ORDER — SODIUM CHLORIDE, SODIUM LACTATE, POTASSIUM CHLORIDE, CALCIUM CHLORIDE 600; 310; 30; 20 MG/100ML; MG/100ML; MG/100ML; MG/100ML
9 INJECTION, SOLUTION INTRAVENOUS CONTINUOUS
Status: CANCELLED | OUTPATIENT
Start: 2021-10-31

## 2021-10-31 RX ORDER — SODIUM CHLORIDE 0.9 % (FLUSH) 0.9 %
10 SYRINGE (ML) INJECTION EVERY 12 HOURS SCHEDULED
Status: CANCELLED | OUTPATIENT
Start: 2021-10-31

## 2021-11-01 ENCOUNTER — HOSPITAL ENCOUNTER (OUTPATIENT)
Facility: HOSPITAL | Age: 51
Setting detail: HOSPITAL OUTPATIENT SURGERY
Discharge: HOME | End: 2021-11-01
Attending: SURGERY | Admitting: SURGERY

## 2021-11-01 ENCOUNTER — ANESTHESIA (OUTPATIENT)
Dept: GASTROENTEROLOGY | Facility: HOSPITAL | Age: 51
End: 2021-11-01

## 2021-11-01 VITALS
DIASTOLIC BLOOD PRESSURE: 54 MMHG | SYSTOLIC BLOOD PRESSURE: 97 MMHG | TEMPERATURE: 97.5 F | HEART RATE: 56 BPM | OXYGEN SATURATION: 91 % | RESPIRATION RATE: 12 BRPM

## 2021-11-01 DIAGNOSIS — K21.9 GASTROESOPHAGEAL REFLUX DISEASE, UNSPECIFIED WHETHER ESOPHAGITIS PRESENT: ICD-10-CM

## 2021-11-01 PROCEDURE — 88305 TISSUE EXAM BY PATHOLOGIST: CPT | Performed by: SURGERY

## 2021-11-01 PROCEDURE — 88342 IMHCHEM/IMCYTCHM 1ST ANTB: CPT | Performed by: SURGERY

## 2021-11-01 PROCEDURE — 43239 EGD BIOPSY SINGLE/MULTIPLE: CPT | Performed by: SURGERY

## 2021-11-01 RX ORDER — ONDANSETRON 2 MG/ML
4 INJECTION INTRAMUSCULAR; INTRAVENOUS ONCE AS NEEDED
Status: DISCONTINUED | OUTPATIENT
Start: 2021-11-01 | End: 2021-11-01 | Stop reason: HOSPADM

## 2021-11-01 RX ORDER — SODIUM CHLORIDE 0.9 % (FLUSH) 0.9 %
10 SYRINGE (ML) INJECTION AS NEEDED
Status: DISCONTINUED | OUTPATIENT
Start: 2021-11-01 | End: 2021-11-01 | Stop reason: HOSPADM

## 2021-11-01 RX ORDER — FAMOTIDINE 10 MG/ML
20 INJECTION, SOLUTION INTRAVENOUS ONCE
Status: COMPLETED | OUTPATIENT
Start: 2021-11-01 | End: 2021-11-01

## 2021-11-01 RX ORDER — ACETAMINOPHEN 325 MG/1
650 TABLET ORAL ONCE AS NEEDED
Status: DISCONTINUED | OUTPATIENT
Start: 2021-11-01 | End: 2021-11-01 | Stop reason: HOSPADM

## 2021-11-01 RX ORDER — IPRATROPIUM BROMIDE AND ALBUTEROL SULFATE 2.5; .5 MG/3ML; MG/3ML
3 SOLUTION RESPIRATORY (INHALATION) ONCE AS NEEDED
Status: DISCONTINUED | OUTPATIENT
Start: 2021-11-01 | End: 2021-11-01 | Stop reason: HOSPADM

## 2021-11-01 RX ORDER — LIDOCAINE HYDROCHLORIDE 10 MG/ML
0.5 INJECTION, SOLUTION EPIDURAL; INFILTRATION; INTRACAUDAL; PERINEURAL ONCE AS NEEDED
Status: DISCONTINUED | OUTPATIENT
Start: 2021-11-01 | End: 2021-11-01

## 2021-11-01 RX ORDER — SODIUM CHLORIDE 9 MG/ML
150 INJECTION, SOLUTION INTRAVENOUS CONTINUOUS
Status: DISCONTINUED | OUTPATIENT
Start: 2021-11-01 | End: 2021-11-01 | Stop reason: HOSPADM

## 2021-11-01 RX ORDER — MIDAZOLAM HYDROCHLORIDE 1 MG/ML
1 INJECTION INTRAMUSCULAR; INTRAVENOUS
Status: DISCONTINUED | OUTPATIENT
Start: 2021-11-01 | End: 2021-11-01 | Stop reason: HOSPADM

## 2021-11-01 RX ADMIN — SODIUM CHLORIDE 500 ML: 9 INJECTION, SOLUTION INTRAVENOUS at 08:03

## 2021-11-01 RX ADMIN — FAMOTIDINE 20 MG: 10 INJECTION INTRAVENOUS at 08:06

## 2021-11-01 RX ADMIN — SODIUM CHLORIDE, PRESERVATIVE FREE 10 ML: 5 INJECTION INTRAVENOUS at 08:03

## 2021-11-01 RX ADMIN — TOPICAL ANESTHETIC 2 SPRAY: 200 SPRAY DENTAL; PERIODONTAL at 08:06

## 2021-11-01 NOTE — ANESTHESIA POSTPROCEDURE EVALUATION
Patient: Gomez Leach    Procedure Summary     Date: 11/01/21 Room / Location:  [facility] /  [facility]    Anesthesia Start: 0840 Anesthesia Stop: 0853    Procedure: ESOPHAGOGASTRODUODENOSCOPY WITH BIOPSY (N/A ) Diagnosis:       Gastroesophageal reflux disease, unspecified whether esophagitis present      (Gastroesophageal reflux disease, unspecified whether esophagitis present [K21.9])    Surgeons: Candice Hayes MD Provider: Joshua Spence MD    Anesthesia Type: general ASA Status: 3          Anesthesia Type: general    Vitals  Vitals Value Taken Time   BP 97/56 11/01/21 0851   Temp 97.5 °F (36.4 °C) 11/01/21 0851   Pulse 52 11/01/21 0852   Resp     SpO2 99 % 11/01/21 0852   Vitals shown include unvalidated device data.        Post Anesthesia Care and Evaluation    Patient location during evaluation: PACU  Patient participation: complete - patient participated  Level of consciousness: awake and alert  Pain management: adequate  Airway patency: patent  Anesthetic complications: No anesthetic complications  PONV Status: none  Cardiovascular status: hemodynamically stable and acceptable  Respiratory status: nonlabored ventilation, acceptable and nasal cannula  Hydration status: acceptable

## 2021-11-01 NOTE — OP NOTE
PROCEDURE NOTE.    Date: 11/01/21    Patient: Gomez Leach     Surgeon: Candice Hayes MD      Preoperative Diagnosis: GERD     Postoperative Diagnosis: GERE     Procedure Performed: Esophagogastroduodenoscopy with biopsy (CPT 27821)    Findings: GEJ at 44 cm, no hiatal hernia, irregular Z line     Specimens: Distal esophageal (43 cm) and antral biopsies     Indications: Gomez Leach is a 51 y.o. year old supermorbidly obese male who is preparing for sleeve gastrectomy.  The patient has a history of reflux that was previously severe, although currently has no symptoms and takes no medicines for reflux.  He presents today for diagnostic endoscopy.       Procedure:      After informed consent was taken, the patient was brought to the operating room and placed in the supine position. MAC was given by anesthesia staff. A bite block was placed and time out performed. A flexible endoscope was passed transorally down to the second portion of the duodenum without difficulty. The scope was slowly withdrawn and the anatomy examined with photodocumentation throughout. The duodenum was normal. The pylorus was without spasm. The antrum of the stomach was without significant gastritis. A biopsy was taken to rule out H. Pylori. The scope was retroflexed and the body, fundus, and cardia were examined. There was no abnormality and no hiatal hernia seen from this angle. The scope was withdrawn back into the esophagus after decompressing the stomach. The GE junction was at 44 cm and the distal esophagus showed an irregular Z line. Biopsy was taken. There was no hiatal hernia. The scope was further withdrawn. There was no other esophageal abnormality. The vocal cords were normal. The scope was withdrawn completely and the procedure concluded. The patient was awakened and taken to recovery in good condition.      Recommendations: We will discuss biopsy results at next office appointment.

## 2021-11-03 ENCOUNTER — OFFICE VISIT (OUTPATIENT)
Dept: PULMONOLOGY | Facility: CLINIC | Age: 51
End: 2021-11-03

## 2021-11-03 VITALS
HEIGHT: 70 IN | SYSTOLIC BLOOD PRESSURE: 132 MMHG | WEIGHT: 315 LBS | DIASTOLIC BLOOD PRESSURE: 88 MMHG | OXYGEN SATURATION: 100 % | HEART RATE: 63 BPM | TEMPERATURE: 98.4 F | BODY MASS INDEX: 45.1 KG/M2

## 2021-11-03 DIAGNOSIS — G47.33 OSA TREATED WITH BIPAP: ICD-10-CM

## 2021-11-03 DIAGNOSIS — Z01.818 PREOPERATIVE CLEARANCE: Primary | ICD-10-CM

## 2021-11-03 PROBLEM — J96.02 ACUTE RESPIRATORY FAILURE WITH HYPOXIA AND HYPERCAPNIA (HCC): Status: RESOLVED | Noted: 2019-06-15 | Resolved: 2021-11-03

## 2021-11-03 PROBLEM — J96.02 ACUTE RESPIRATORY FAILURE WITH HYPOXIA AND HYPERCAPNIA (HCC): Status: ACTIVE | Noted: 2019-06-15

## 2021-11-03 PROBLEM — J80 ARDS (ADULT RESPIRATORY DISTRESS SYNDROME) (HCC): Status: ACTIVE | Noted: 2021-11-03

## 2021-11-03 PROBLEM — J96.01 ACUTE RESPIRATORY FAILURE WITH HYPOXIA AND HYPERCAPNIA (HCC): Status: RESOLVED | Noted: 2019-06-15 | Resolved: 2021-11-03

## 2021-11-03 PROBLEM — I27.20 PULMONARY HYPERTENSION (HCC): Status: ACTIVE | Noted: 2019-06-16

## 2021-11-03 PROBLEM — T17.908A ASPIRATION INTO AIRWAY: Status: ACTIVE | Noted: 2021-11-03

## 2021-11-03 PROBLEM — J96.01 ACUTE RESPIRATORY FAILURE WITH HYPOXIA AND HYPERCAPNIA (HCC): Status: ACTIVE | Noted: 2019-06-15

## 2021-11-03 LAB
CYTO UR: NORMAL
LAB AP CASE REPORT: NORMAL
LAB AP CLINICAL INFORMATION: NORMAL
PATH REPORT.FINAL DX SPEC: NORMAL
PATH REPORT.GROSS SPEC: NORMAL

## 2021-11-03 PROCEDURE — 99204 OFFICE O/P NEW MOD 45 MIN: CPT | Performed by: NURSE PRACTITIONER

## 2021-11-03 NOTE — PROGRESS NOTES
"[facility] Pulmonary Evaluation    Chief Complaint  Surgical Clearance    Referring Provider:     Nilda          Gomez Leach presents to [facility] PULMON[facility]ICINE for preoperative evaluation.  He is pursuing gastric surgery with the sleeve procedure.  He has had multiple surgeries in the past including a hernia surgery several years ago.    He does have a history of tobacco abuse he quit over 2 years ago.  He was a 35 pack/year history.  He denies any history of chronic bronchitis.  No cough or sputum production.  No history of asthma or has ever been diagnosed with COPD or emphysema.  He denies any significant shortness of breath with activity.  No chest tightness or wheezing.    He does have some mild seasonal allergies with watery eyes and sneezing but no significant postnasal drainage or coughing.    He does have history of struct of sleep apnea wears a BiPAP at night.  He wears it nightly.  He follows up with sleep medicine in [facility].    He did have an episode in June 2019 where he had a pneumonia and was hospitalized in [facility] in [facility].  He ultimately did require intubation with prolonged mechanical ventilation and tracheostomy with PEG and transfer to LTAC.  With eventual resolution of symptoms and back to baseline.  He states he has had no prolonged issues after that.        Objective     Vital Signs:   /88   Pulse 63   Temp 98.4 °F (36.9 °C)   Ht 177.8 cm (70\")   Wt (!) 146 kg (321 lb)   SpO2 100% Comment: resting,room air  BMI 46.06 kg/m²       Immunization History   Administered Date(s) Administered   • COVID-19 (MODERNA) 04/05/2021, 05/04/2021   • Flublok 18+yrs 09/16/2020, 09/28/2021   • Influenza, Unspecified 12/05/2012   • Pneumococcal Polysaccharide (PPSV23) 05/01/2018   • Tdap 04/08/2015       Physical Exam  Vitals reviewed.   Constitutional:       General: He is not in acute distress.     Appearance: He is " well-developed. He is obese. He is not ill-appearing.   HENT:      Head: Normocephalic and atraumatic.   Eyes:      Pupils: Pupils are equal, round, and reactive to light.   Cardiovascular:      Rate and Rhythm: Normal rate and regular rhythm.      Heart sounds: No murmur heard.      Pulmonary:      Effort: Pulmonary effort is normal. No respiratory distress.      Breath sounds: Normal breath sounds. No wheezing or rales.   Abdominal:      General: Bowel sounds are normal. There is no distension.      Palpations: Abdomen is soft.   Musculoskeletal:         General: Normal range of motion.      Cervical back: Normal range of motion and neck supple.   Skin:     General: Skin is warm and dry.      Findings: No erythema.   Neurological:      Mental Status: He is alert and oriented to person, place, and time.   Psychiatric:         Behavior: Behavior normal.          Result Review :     CBC    CBC 9/9/21 10/29/21   WBC 8.8 7.51   RBC 4.67 4.58   Hemoglobin 12.9 (A) 13.3   Hematocrit 39.2 40.4   MCV 84 88.2   MCH 27.6 29.0   MCHC 32.9 32.9   RDW 14.6 12.6   Platelets 253 212   (A) Abnormal value            BMP    BMP 9/9/21 10/29/21   BUN 17    Creatinine 1.29 (A)    Sodium 138    Potassium 4.8 5.2   Chloride 102    CO2 24    Calcium 9.4    (A) Abnormal value            Data reviewed: Discharge summary from [facility] and June 2019     PFTs done in the office today:  Unable to complete secondary to no COVID-19 pretesting done    PA and lateral chest x-ray done the office today reviewed by me  Awaiting final MD interpretation                 Assessment and Plan    Problem List Items Addressed This Visit        Endocrine and Metabolic    Body mass index 45.0-49.9, adult (HCC)       Sleep    BERKLEY treated with BiPAP      Other Visit Diagnoses     Preoperative clearance    -  Primary    Relevant Orders    XR Chest PA & Lateral          With his underlying obstructive sleep apnea as well as this remote history of pneumonia  and intubation he would be at a moderate perioperative risk factor for pulmonary complications.  We did discuss this today in the office.    We also discussed the importance of continued care postoperatively including his incentive spirometer and early ambulation.    ARISCAT Preoperative Pulmonary Risk Index.    Age []   <= 50 years old (0 points)    [x]   51-80 years old (3 points)    []   >80 years old (16 points)       Preoperative Oxygen Saturation [x]   >= 96% (0 points)    []   91-95% (8 points)    []   <= 90% (24 points)       Other Clinical Risk Factors []   Respiratory Infection in the last month (17 points)    []   Pre-operative anemia: Hb < 10 g/dL (11 points)    []   Emergency Surgery (8 points)       Surgical Incision [x]   Upper abdominal (15 points)    []   Intrathoracic (24 points)       Duration of Surgery []   < 2 hours (0 points)    [x]   2-3 hours (16 points)    []   >3 hours (23 points)       Total Criteria Point Count: 34     ARISCAT risk index interpretation:      0-25 points: Low risk  1.6 % pulmonary complication rate.   26-44 points: Intermediate risk 13.3% pulmonary complication rate.    points: High risk 42.1 % pulmonary complication rate.     Postoperative Pulmonary Complications include but are not limited to: Respiratory Failure, Pulmonary Infection, Pleural Effusion, Atelectasis, Pneumothorax Bronchospasm, Aspiration Pneumonia.        I spent 50 minutes caring for Gomez on this date of service. This time includes time spent by me in the following activities:preparing for the visit, reviewing tests, obtaining and/or reviewing a separately obtained history, performing a medically appropriate examination and/or evaluation , counseling and educating the patient/family/caregiver, ordering medications, tests, or procedures, referring and communicating with other health care professionals , documenting information in the medical record, independently interpreting results and  communicating that information with the patient/family/caregiver and care coordination    Follow Up     No follow-ups on file.  Patient was given instructions and counseling regarding his condition or for health maintenance advice. Please see specific information pulled into the AVS if appropriate.     LUMA Blue, ACNP  [facility] Critical [facility] Medicine  Electronically signed

## 2021-12-15 ENCOUNTER — TELEMEDICINE (OUTPATIENT)
Dept: PSYCHIATRY | Facility: CLINIC | Age: 51
End: 2021-12-15

## 2021-12-15 DIAGNOSIS — F41.1 GENERALIZED ANXIETY DISORDER: ICD-10-CM

## 2021-12-15 DIAGNOSIS — F31.89 OTHER BIPOLAR DISORDER (HCC): Primary | ICD-10-CM

## 2021-12-15 PROCEDURE — 90792 PSYCH DIAG EVAL W/MED SRVCS: CPT

## 2021-12-15 NOTE — PROGRESS NOTES
This provider is located at [place], [place]. The Patient is seen remotely using Video. Patient is being seen via telehealth and confirm that they are in a secure environment for this session. Patient is located in [place], [place]. The patient's condition being diagnosed/treated is appropriate for telemedicine. Provider identified as Dayan Martini as well as credentials APRN MSN PMHNP-BC.   The client/patient gave consent to be seen remotely, and when consent is given they understand that the consent allows for patient identifiable information to be sent to a third party as needed.  They may refuse to be seen remotely at any time. The electronic data is encrypted and password protected, and the patient has been advised of the potential risks to privacy not withstanding such measures.    Subjective     Gomez Leach is a 51 y.o. male who presents today for initial evaluation     Chief Complaint: Prebariatric psychiatric eval, bipolar disorder, anxiety    BMI: 46.1    History of Present Illness: The first encounter for this APRN with the patient.  Patient is a referral to have a prebariatric surgery psychiatric eval.  Patient states that he has been treated for bipolar disorder for the past 2 to 3 years.  He states that he has episodes where he is more productive and has more energy.  He states these do not last days at a time, but more like 1 day.  He states during this time he has had some impulsive behavior and spending spree type behavior.  Also states he has increased irritability during these times.  States most of the time that he has depression.  States that the Vraylar currently takes has helped stabilize his mood.  Currently rates his depression a 3-4 on a 1-10 scale with 10 being the worst.  He states his depression is manageable at this time.  Denies any suicidal or homicidal ideation.  States his sleep has been a little worse lately.  States Judy kapoor has him on clonidine, but he is going to  ask her for an increase at his visit next week with her.  States his appetite is good.  Patient states he also suffers from anxiety.  He rates it a 5-6 on a 1-10 scale with 10 being the worst.  States he is a worrier and over thinker.  He denies any history of any panic attacks.  Denies any history of auditory or visual hallucinations.  Denies any paranoia.  Denies any side effects to his medications.    Patient states the motivation for him to have weight loss surgery is that he is tired of being overweight.  He states weight is always been an issue for him.  He states he is tried several diets and always would gain the weight back that he lost.  Patient is aware of the potential risk and complications associated with surgery.  Patient has a good support system and his wife and daughters.  Patient is aware of the diet that will be required of him pre and post surgery.    The following portions of the patient's history were reviewed and updated as appropriate: allergies, current medications, past family history, past medical history, past social history, past surgical history and problem list.    Past Psychiatric History: Patient denies any history of inpatient psychiatric treatment.  Denies any history of suicide attempts.  He has been treated for bipolar disorder for the last 2 to 3 years by Judy Escalera at [facility].  He is currently stabilized on Vraylar 3 mg daily.  Has been tried on Prozac and Zoloft in the past.    Family Psychiatric History: Brother has schizophrenia.  No suicides among first-degree relatives.    Substance Use History: Patient has a history of drug and alcohol addiction.  States he has been clean since November 9, 2018.  Currently takes Suboxone from [facility].  Stopped smoking 2 years ago.    Past Medical History:  Past Medical History:   Diagnosis Date   • Anxiety    • Back pain    • Depression    • Dyspepsia    • GERD (gastroesophageal reflux disease)     in  "the past, no longer has.  Does not take medicine anymore.  Previously severe.   • History of narcotic addiction (HCC) 2017    on suboxone.     • History of pneumonia    • HTN (hypertension)    • Migraine     but is on topamax for weight loss   • BERKLEY (obstructive sleep apnea)     bipap compliant   • Osteoarthritis    • Peripheral neuropathy     attributed to bulging discs, DDD   • Prediabetes    • Seizures (HCC)     2 years ago during ICU admission for pneumonia requiring prolonged ventilation (has only happened once in lifetime)   • Venous insufficiency     d/t venous reflux.  Chronic skin changes and occasional nonhealing ulcer.  Typically wears compression hose.       Social History: Patient was born in [place], [place].  Currently lives in [place], [place].  He was raised by his mother and father, then they  at age 7 and he was raised by his mother.  He has 1 brother and 1 sister.  Has another sister that was killed in a car accident.  Denies any history of abuse.  Patient went to 12th grade, but did not graduate.  He has been disabled for venous insufficiency, herniated disc and lower back pain.  He has been  for 27 years.  He has 2 daughters ages 26 and 23.  Currently lives with his wife.  Denies any legal issues.  Hobbies include playing the [employer], drawing and painting.  Social History     Socioeconomic History   • Marital status:    Tobacco Use   • Smoking status: Former Smoker     Packs/day: 1.00     Years: 35.00     Pack years: 35.00     Types: Cigarettes     Quit date: 2019     Years since quittin.9   • Smokeless tobacco: Former User     Types: Snuff     Quit date: 10/22/2021   • Tobacco comment: uses tobacco free/nicotine free chew.  Sometimes chews nicotine gum   Vaping Use   • Vaping Use: Never used   Substance and Sexual Activity   • Alcohol use: Yes     Alcohol/week: 1.0 - 2.0 standard drink     Types: 1 - 2 Cans of beer per week     Comment: \"about a 6 " "pack per month\"   • Drug use: Not Currently     Comment: History of narcotic addiction   • Sexual activity: Defer       Family History:  Family History   Problem Relation Age of Onset   • Hypertension Mother    • Stroke Mother    • Stroke Father        Past Surgical History:  Past Surgical History:   Procedure Laterality Date   • CARPAL TUNNEL RELEASE Bilateral    • COLONOSCOPY     • ENDOSCOPY N/A 11/1/2021    Procedure: ESOPHAGOGASTRODUODENOSCOPY WITH BIOPSY;  Surgeon: Candice Hayes MD;  Location: [facility] ENDOSCOPY;  Service: General;  Laterality: N/A;   • EYE ENUCLEATION      right eye, traumatic injury as a child, has prosthesis   • HERNIA REPAIR      recurrent, pt reports 4 repair, now has a mesh.  [place], [place]   • KNEE SURGERY Right     TKR and 2 meniscus repair before   • ORIF TIBIA/FIBULA FRACTURES Bilateral    • PEG TUBE INSERTION      2 years ago after aspiration pneumonia   • PEG TUBE REMOVAL  2017   • TRACHEOSTOMY     • VEIN SURGERY Bilateral     \"rerouted veins to close off the blood\"       Problem List:  Patient Active Problem List   Diagnosis   • Gastroesophageal reflux disease   • BERKLEY treated with BiPAP   • ARDS (adult respiratory distress syndrome) ([facility])   • Body mass index 45.0-49.9, adult ([facility])   • Pulmonary hypertension ([facility])   • Aspiration into airway       Allergy:   No Known Allergies     Current Medications:   Current Outpatient Medications   Medication Sig Dispense Refill   • buprenorphine-naloxone (SUBOXONE) 8-2 MG per SL tablet Place 1 tablet under the tongue Daily.     • Cariprazine HCl (Vraylar) 3 MG capsule capsule Take 3 mg by mouth Daily.     • carvedilol (COREG) 6.25 MG tablet Take 6.25 mg by mouth 2 (Two) Times a Day With Meals.     • cloNIDine (CATAPRES) 0.1 MG tablet Take 0.1 mg by mouth Every Night.     • gabapentin (NEURONTIN) 600 MG tablet Take 600 mg by mouth 2 (two) times a day.     • metFORMIN (GLUCOPHAGE) 850 MG tablet Take 850 mg by mouth 2 (Two) Times a Day With " Meals.       No current facility-administered medications for this visit.       Review of Symptoms:    Review of Systems   Constitutional: Negative.    HENT: Negative.    Eyes: Negative.    Respiratory:        Sleep apnea   Cardiovascular: Negative.    Gastrointestinal: Positive for GERD.   Endocrine: Negative.    Genitourinary: Negative.    Musculoskeletal: Positive for back pain.   Skin: Negative.    Allergic/Immunologic: Negative.    Neurological:        States he had one seizure in 2020 and was in a coma for 3-1/2 weeks.   Hematological: Negative.    Psychiatric/Behavioral: Positive for depressed mood. The patient is nervous/anxious.          Physical Exam:   There were no vitals taken for this visit.    Appearance: Normal  Gait, Station, Strength: Within normal limits    Mental Status Exam:   Hygiene:   good  Cooperation:  Cooperative  Eye Contact:  Good  Psychomotor Behavior:  Appropriate  Affect:  Full range  Mood: normal  Hopelessness: Denies  Speech:  Normal  Thought Process:  Goal directed  Thought Content:  Normal  Suicidal:  None  Homicidal:  None  Hallucinations:  None  Delusion:  None  Memory:  Intact  Orientation:  Person, Place, Time and Situation  Reliability:  good  Insight:  Good  Judgement:  Good  Impulse Control:  Good    PHQ-9 Depression Screening  Little interest or pleasure in doing things? 1   Feeling down, depressed, or hopeless? 1   Trouble falling or staying asleep, or sleeping too much? 1   Feeling tired or having little energy? 1   Poor appetite or overeating? 0   Feeling bad about yourself - or that you are a failure or have let yourself or your family down? 1   Trouble concentrating on things, such as reading the newspaper or watching television? 2   Moving or speaking so slowly that other people could have noticed? Or the opposite - being so fidgety or restless that you have been moving around a lot more than usual? 0   Thoughts that you would be better off dead, or of hurting  yourself in some way? 0   PHQ-9 Total Score 7   If you checked off any problems, how difficult have these problems made it for you to do your work, take care of things at home, or get along with other people? Somewhat difficult     PHQ-9 Total Score: 7    GINETTE 7 anxiety screening tool that patient filled out virtually reviewed by this APRN at today's encounter.    PROMIS scale screening tool that patient filled out virtually reviewed by this APRN at today's encounter.    Previous Provider notes and available records reviewed by this APRN today.     Lab Results:   Admission on 11/01/2021, Discharged on 11/01/2021   Component Date Value Ref Range Status   • Case Report 11/01/2021    Final                    Value:Surgical Pathology Report                         Case: [id number]                                  Authorizing Provider:  Candice Hayes MD  Collected:           11/01/2021 08:45 AM          Ordering Location:     [facility]   Received:            11/01/2021 11:43 AM                                 ENDO SUITES                                                                  Pathologist:           Juan Manuel Vick MD                                                            Specimens:   1) - Gastric, Antrum                                                                                2) - Esophagus, esophagus at 43 cm                                                        • Clinical Information 11/01/2021    Final                    Value:This result contains rich text formatting which cannot be displayed here.   • Final Diagnosis 11/01/2021    Final                    Value:This result contains rich text formatting which cannot be displayed here.   • Gross Description 11/01/2021    Final                    Value:This result contains rich text formatting which cannot be displayed here.   • Microscopic Description 11/01/2021    Final                    Value:This result contains rich text  formatting which cannot be displayed here.   Pre-Admission Testing on 10/29/2021   Component Date Value Ref Range Status   • Potassium 10/29/2021 5.2  3.5 - 5.2 mmol/L Final   • WBC 10/29/2021 7.51  3.40 - 10.80 10*3/mm3 Final   • RBC 10/29/2021 4.58  4.14 - 5.80 10*6/mm3 Final   • Hemoglobin 10/29/2021 13.3  13.0 - 17.7 g/dL Final   • Hematocrit 10/29/2021 40.4  37.5 - 51.0 % Final   • MCV 10/29/2021 88.2  79.0 - 97.0 fL Final   • MCH 10/29/2021 29.0  26.6 - 33.0 pg Final   • MCHC 10/29/2021 32.9  31.5 - 35.7 g/dL Final   • RDW 10/29/2021 12.6  12.3 - 15.4 % Final   • RDW-SD 10/29/2021 40.1  37.0 - 54.0 fl Final   • MPV 10/29/2021 9.5  6.0 - 12.0 fL Final   • Platelets 10/29/2021 212  140 - 450 10*3/mm3 Final   • QT Interval 10/29/2021 424  ms Final   • QTC Interval 10/29/2021 424  ms Final   • COVID19 10/29/2021 Not Detected  Not Detected - Ref. Range Final   Office Visit on 09/09/2021   Component Date Value Ref Range Status   • WBC 09/09/2021 8.8  3.4 - 10.8 x10E3/uL Final   • RBC 09/09/2021 4.67  4.14 - 5.80 x10E6/uL Final   • Hemoglobin 09/09/2021 12.9* 13.0 - 17.7 g/dL Final   • Hematocrit 09/09/2021 39.2  37.5 - 51.0 % Final   • MCV 09/09/2021 84  79 - 97 fL Final   • MCH 09/09/2021 27.6  26.6 - 33.0 pg Final   • MCHC 09/09/2021 32.9  31.5 - 35.7 g/dL Final   • RDW 09/09/2021 14.6  11.6 - 15.4 % Final   • Platelets 09/09/2021 253  150 - 450 x10E3/uL Final   • Neutrophil Rel % 09/09/2021 67  Not Estab. % Final   • Lymphocyte Rel % 09/09/2021 23  Not Estab. % Final   • Monocyte Rel % 09/09/2021 6  Not Estab. % Final   • Eosinophil Rel % 09/09/2021 2  Not Estab. % Final   • Basophil Rel % 09/09/2021 1  Not Estab. % Final   • Neutrophils Absolute 09/09/2021 6.0  1.4 - 7.0 x10E3/uL Final   • Lymphocytes Absolute 09/09/2021 2.1  0.7 - 3.1 x10E3/uL Final   • Monocytes Absolute 09/09/2021 0.5  0.1 - 0.9 x10E3/uL Final   • Eosinophils Absolute 09/09/2021 0.1  0.0 - 0.4 x10E3/uL Final   • Basophils Absolute 09/09/2021  0.1  0.0 - 0.2 x10E3/uL Final   • Immature Granulocyte Rel % 09/09/2021 1  Not Estab. % Final   • Immature Grans Absolute 09/09/2021 0.0  0.0 - 0.1 x10E3/uL Final   • Glucose 09/09/2021 96  65 - 99 mg/dL Final   • BUN 09/09/2021 17  6 - 24 mg/dL Final   • Creatinine 09/09/2021 1.29* 0.76 - 1.27 mg/dL Final   • eGFR Non  Am 09/09/2021 63  >59 mL/min/1.73 Final   • eGFR African Am 09/09/2021 73  >59 mL/min/1.73 Final    Comment: **[facility] currently reports eGFR in compliance with the current**    recommendations of the [employer] Kid[employer]. [facility] will    update reporting as new guidelines are published from the NKF-ASN    Task force.     • BUN/Creatinine Ratio 09/09/2021 13  9 - 20 Final   • Sodium 09/09/2021 138  134 - 144 mmol/L Final   • Potassium 09/09/2021 4.8  3.5 - 5.2 mmol/L Final   • Chloride 09/09/2021 102  96 - 106 mmol/L Final   • Total CO2 09/09/2021 24  20 - 29 mmol/L Final   • Calcium 09/09/2021 9.4  8.7 - 10.2 mg/dL Final   • Total Protein 09/09/2021 6.6  6.0 - 8.5 g/dL Final   • Albumin 09/09/2021 4.3  3.8 - 4.9 g/dL Final   • Globulin 09/09/2021 2.3  1.5 - 4.5 g/dL Final   • A/G Ratio 09/09/2021 1.9  1.2 - 2.2 Final   • Total Bilirubin 09/09/2021 0.4  0.0 - 1.2 mg/dL Final   • Alkaline Phosphatase 09/09/2021 79  48 - 121 IU/L Final    Comment: **Effective September 13, 2021 Alkaline Phosphatase**    reference interval will be changing to:               Age                Male          Female            0 -  5 days         47 - 127       47 - 127            6 - 10 days         29 - 242       29 - 242           11 - 20 days        109 - 357      109 - 357           21 - 30 days         94 - 494       94 - 494            1 -  2 months      149 - 539      149  539            3 -  6 months      131 - 452      131 - 452            7 - 11 months      117 - 401      117 - 401    12 months -  6 years       158 - 369      158 - 369            7 - 12 years       150 - 409      150 - 409                 13 years       156 - 435       78 - 227                14 years       114 - 375       64 - 161                15 years        88 - 279       56 - 134                16 years        74 - 207       51 - 121                17 years        63 - 161       47 - 113           18 - 20 years        51 - 125       42 - 106               >20 years                                   44 - 121       44 - 121     • AST (SGOT) 09/09/2021 12  0 - 40 IU/L Final   • ALT (SGPT) 09/09/2021 12  0 - 44 IU/L Final   • H. pylori Breath Test 09/09/2021 Negative  Negative Final   • Hemoglobin A1C 09/09/2021 5.8* 4.8 - 5.6 % Final    Comment:          Prediabetes: 5.7 - 6.4           Diabetes: >6.4           Glycemic control for adults with diabetes: <7.0     • Total Cholesterol 09/09/2021 148  100 - 199 mg/dL Final   • Triglycerides 09/09/2021 134  0 - 149 mg/dL Final   • HDL Cholesterol 09/09/2021 45  >39 mg/dL Final   • VLDL Cholesterol Joel 09/09/2021 24  5 - 40 mg/dL Final   • LDL Chol Calc (NIH) 09/09/2021 79  0 - 99 mg/dL Final   • TSH 09/09/2021 1.990  0.450 - 4.500 uIU/mL Final       Assessment/Plan   Problems Addressed this Visit     None      Visit Diagnoses     Other bipolar disorder (HCC)    -  Primary    Generalized anxiety disorder          Diagnoses       Codes Comments    Other bipolar disorder (HCC)    -  Primary ICD-10-CM: F31.89  ICD-9-CM: 296.89     Generalized anxiety disorder     ICD-10-CM: F41.1  ICD-9-CM: 300.02         Visit Diagnoses:    ICD-10-CM ICD-9-CM   1. Other bipolar disorder (HCC)  F31.89 296.89   2. Generalized anxiety disorder  F41.1 300.02     From a psychiatric standpoint, the patient presents as a very good candidate for bariatric surgery.  Patient is motivated for the surgery, has showed readiness for the lifestyle change in terms of adjusting eating habits, and seems to have appropriate expectations of how to prepare and how to live after surgery in order to lose weight successfully.Patient has  good coping skills.    With ongoing treatment, there are no barriers from a psychiatric point of view to having bariatric surgery.    Patient is currently seeing Dr. Judy Escalera for psychiatric treatment at [facility].  He is currently stable on Vraylar 3 mg daily.  No further changes are recommended at this time.  Patient did state that he has not been sleeping as well, and he takes clonidine 0.1 mg for that.  States he has an appointment next week with Judy Escalera and he is going to talk with her about doing something for sleep.  No further follow-up will be necessary with our clinic at this time.    TREATMENT PLAN/GOALS: Continue supportive psychotherapy efforts and medications as indicated. Treatment and medication options discussed during today's visit. Patient acknowledged and verbally consented to continue with current treatment plan and was educated on the importance of compliance with treatment and follow-up appointments.    Short Term Goals: Patient will be compliant with medication, and patient will have no significant medication related side effects.  Patient will be engaged in psychotherapy as indicated.  Patient will report subjective improvement of symptoms.  Patient will have weight loss surgery.    Long term goals: To stabilize mood and treat/improve subjective symptoms, the patient will stay out of the hospital, the patient will be at an optimal level of functioning, and the patient will take all medications as prescribed.  Patient will achieve desired weight loss.  The patient verbalized understanding and agreement with goals that were mutually set.    MEDICATION ISSUES:    Discussed medication options and treatment plan of prescribed medication as well as the risks, benefits, and side effects including potential falls, possible impaired driving and metabolic adversities among others. Patient is agreeable to call the office with any worsening of symptoms or onset of side effects.  Patient is agreeable to call 911 or go to the nearest ER should he/she begin having SI/HI.     MEDS ORDERED DURING VISIT:  No orders of the defined types were placed in this encounter.      No follow-ups on file.             This document has been electronically signed by LUMA Francois  December 15, 2021 10:43 EST    Part of this note may be an electronic transmission of spoken language to printed text using the Dragon Dictation System.

## (undated) DEVICE — CONTN GRAD MEAS TRIANG 32OZ BLK

## (undated) DEVICE — SINGLE-USE BIOPSY FORCEPS: Brand: RADIAL JAW 4

## (undated) DEVICE — Device: Brand: DEFENDO AIR/WATER/SUCTION AND BIOPSY VALVE